# Patient Record
Sex: FEMALE | Race: WHITE
[De-identification: names, ages, dates, MRNs, and addresses within clinical notes are randomized per-mention and may not be internally consistent; named-entity substitution may affect disease eponyms.]

---

## 2015-02-24 RX ORDER — OLANZAPINE 15 MG/1
1 TABLET, FILM COATED ORAL
Qty: 0 | Refills: 0 | COMMUNITY
Start: 2015-02-24

## 2015-02-24 RX ORDER — ATORVASTATIN CALCIUM 80 MG/1
1 TABLET, FILM COATED ORAL
Qty: 0 | Refills: 0 | COMMUNITY
Start: 2015-02-24

## 2017-04-19 ENCOUNTER — APPOINTMENT (OUTPATIENT)
Dept: ORTHOPEDIC SURGERY | Facility: CLINIC | Age: 77
End: 2017-04-19

## 2017-04-19 DIAGNOSIS — Z96.659 PRESENCE OF UNSPECIFIED ARTIFICIAL KNEE JOINT: ICD-10-CM

## 2017-04-19 DIAGNOSIS — M54.5 LOW BACK PAIN: ICD-10-CM

## 2017-04-19 RX ORDER — ATORVASTATIN CALCIUM 80 MG/1
TABLET, FILM COATED ORAL
Refills: 0 | Status: ACTIVE | COMMUNITY

## 2017-04-19 RX ORDER — SERTRALINE 25 MG/1
TABLET, FILM COATED ORAL
Refills: 0 | Status: ACTIVE | COMMUNITY

## 2017-04-19 RX ORDER — DIVALPROEX SODIUM 500 MG/1
TABLET, DELAYED RELEASE ORAL
Refills: 0 | Status: ACTIVE | COMMUNITY

## 2017-04-19 RX ORDER — OLANZAPINE 20 MG/1
TABLET, FILM COATED ORAL
Refills: 0 | Status: ACTIVE | COMMUNITY

## 2017-04-21 ENCOUNTER — INPATIENT (INPATIENT)
Facility: HOSPITAL | Age: 77
LOS: 4 days | Discharge: HOME | DRG: 312 | End: 2017-04-26
Attending: HOSPITALIST | Admitting: HOSPITALIST
Payer: MEDICARE

## 2017-04-21 VITALS — SYSTOLIC BLOOD PRESSURE: 120 MMHG | DIASTOLIC BLOOD PRESSURE: 60 MMHG | HEART RATE: 70 BPM | RESPIRATION RATE: 16 BRPM

## 2017-04-21 DIAGNOSIS — R55 SYNCOPE AND COLLAPSE: ICD-10-CM

## 2017-04-21 DIAGNOSIS — F32.2 MAJOR DEPRESSIVE DISORDER, SINGLE EPISODE, SEVERE WITHOUT PSYCHOTIC FEATURES: ICD-10-CM

## 2017-04-21 DIAGNOSIS — Z98.89 OTHER SPECIFIED POSTPROCEDURAL STATES: Chronic | ICD-10-CM

## 2017-04-21 DIAGNOSIS — Z96.651 PRESENCE OF RIGHT ARTIFICIAL KNEE JOINT: Chronic | ICD-10-CM

## 2017-04-21 DIAGNOSIS — K46.9 UNSPECIFIED ABDOMINAL HERNIA WITHOUT OBSTRUCTION OR GANGRENE: Chronic | ICD-10-CM

## 2017-04-21 DIAGNOSIS — E78.5 HYPERLIPIDEMIA, UNSPECIFIED: ICD-10-CM

## 2017-04-21 LAB
ALBUMIN SERPL ELPH-MCNC: 4.2 G/DL — SIGNIFICANT CHANGE UP (ref 3.3–5)
ALP SERPL-CCNC: 60 U/L — SIGNIFICANT CHANGE UP (ref 40–120)
ALT FLD-CCNC: 12 U/L RC — SIGNIFICANT CHANGE UP (ref 10–45)
ANION GAP SERPL CALC-SCNC: 11 MMOL/L — SIGNIFICANT CHANGE UP (ref 5–17)
APAP SERPL-MCNC: <15 UG/ML — SIGNIFICANT CHANGE UP (ref 10–30)
APPEARANCE UR: CLEAR — SIGNIFICANT CHANGE UP
APTT BLD: 30 SEC — SIGNIFICANT CHANGE UP (ref 27.5–37.4)
AST SERPL-CCNC: 19 U/L — SIGNIFICANT CHANGE UP (ref 10–40)
BACTERIA # UR AUTO: ABNORMAL /HPF
BASOPHILS # BLD AUTO: 0.1 K/UL — SIGNIFICANT CHANGE UP (ref 0–0.2)
BASOPHILS NFR BLD AUTO: 1.1 % — SIGNIFICANT CHANGE UP (ref 0–2)
BILIRUB SERPL-MCNC: 0.2 MG/DL — SIGNIFICANT CHANGE UP (ref 0.2–1.2)
BILIRUB UR-MCNC: NEGATIVE — SIGNIFICANT CHANGE UP
BUN SERPL-MCNC: 15 MG/DL — SIGNIFICANT CHANGE UP (ref 7–23)
CALCIUM SERPL-MCNC: 10 MG/DL — SIGNIFICANT CHANGE UP (ref 8.4–10.5)
CHLORIDE SERPL-SCNC: 98 MMOL/L — SIGNIFICANT CHANGE UP (ref 96–108)
CK MB BLD-MCNC: 3.4 % — SIGNIFICANT CHANGE UP (ref 0–3.5)
CK MB CFR SERPL CALC: 3.4 NG/ML — SIGNIFICANT CHANGE UP (ref 0–3.8)
CK MB CFR SERPL CALC: 4 NG/ML — HIGH (ref 0–3.8)
CK SERPL-CCNC: 114 U/L — SIGNIFICANT CHANGE UP (ref 25–170)
CK SERPL-CCNC: 117 U/L — SIGNIFICANT CHANGE UP (ref 25–170)
CO2 SERPL-SCNC: 28 MMOL/L — SIGNIFICANT CHANGE UP (ref 22–31)
COLOR SPEC: SIGNIFICANT CHANGE UP
COMMENT - URINE: SIGNIFICANT CHANGE UP
CREAT SERPL-MCNC: 0.8 MG/DL — SIGNIFICANT CHANGE UP (ref 0.5–1.3)
DIFF PNL FLD: NEGATIVE — SIGNIFICANT CHANGE UP
EOSINOPHIL # BLD AUTO: 0.1 K/UL — SIGNIFICANT CHANGE UP (ref 0–0.5)
EOSINOPHIL NFR BLD AUTO: 1 % — SIGNIFICANT CHANGE UP (ref 0–6)
EPI CELLS # UR: SIGNIFICANT CHANGE UP /HPF
ETHANOL SERPL-MCNC: SIGNIFICANT CHANGE UP MG/DL (ref 0–10)
GAS PNL BLDV: SIGNIFICANT CHANGE UP
GLUCOSE SERPL-MCNC: 117 MG/DL — HIGH (ref 70–99)
GLUCOSE UR QL: NEGATIVE — SIGNIFICANT CHANGE UP
HCT VFR BLD CALC: 36.8 % — SIGNIFICANT CHANGE UP (ref 34.5–45)
HGB BLD-MCNC: 12.2 G/DL — SIGNIFICANT CHANGE UP (ref 11.5–15.5)
INR BLD: 1.05 RATIO — SIGNIFICANT CHANGE UP (ref 0.88–1.16)
KETONES UR-MCNC: NEGATIVE — SIGNIFICANT CHANGE UP
LEUKOCYTE ESTERASE UR-ACNC: NEGATIVE — SIGNIFICANT CHANGE UP
LYMPHOCYTES # BLD AUTO: 2.2 K/UL — SIGNIFICANT CHANGE UP (ref 1–3.3)
LYMPHOCYTES # BLD AUTO: 25.1 % — SIGNIFICANT CHANGE UP (ref 13–44)
MCHC RBC-ENTMCNC: 32.8 PG — SIGNIFICANT CHANGE UP (ref 27–34)
MCHC RBC-ENTMCNC: 33.2 GM/DL — SIGNIFICANT CHANGE UP (ref 32–36)
MCV RBC AUTO: 98.9 FL — SIGNIFICANT CHANGE UP (ref 80–100)
MONOCYTES # BLD AUTO: 0.9 K/UL — SIGNIFICANT CHANGE UP (ref 0–0.9)
MONOCYTES NFR BLD AUTO: 10.1 % — SIGNIFICANT CHANGE UP (ref 2–14)
NEUTROPHILS # BLD AUTO: 5.4 K/UL — SIGNIFICANT CHANGE UP (ref 1.8–7.4)
NEUTROPHILS NFR BLD AUTO: 62.7 % — SIGNIFICANT CHANGE UP (ref 43–77)
NITRITE UR-MCNC: NEGATIVE — SIGNIFICANT CHANGE UP
PCP SPEC-MCNC: SIGNIFICANT CHANGE UP
PH UR: 6.5 — SIGNIFICANT CHANGE UP (ref 5–8)
PLATELET # BLD AUTO: 217 K/UL — SIGNIFICANT CHANGE UP (ref 150–400)
POTASSIUM SERPL-MCNC: 4.6 MMOL/L — SIGNIFICANT CHANGE UP (ref 3.5–5.3)
POTASSIUM SERPL-SCNC: 4.6 MMOL/L — SIGNIFICANT CHANGE UP (ref 3.5–5.3)
PROT SERPL-MCNC: 8.1 G/DL — SIGNIFICANT CHANGE UP (ref 6–8.3)
PROT UR-MCNC: NEGATIVE — SIGNIFICANT CHANGE UP
PROTHROM AB SERPL-ACNC: 11.3 SEC — SIGNIFICANT CHANGE UP (ref 9.8–12.7)
RBC # BLD: 3.72 M/UL — LOW (ref 3.8–5.2)
RBC # FLD: 12.2 % — SIGNIFICANT CHANGE UP (ref 10.3–14.5)
SALICYLATES SERPL-MCNC: <2 MG/DL — LOW (ref 15–30)
SODIUM SERPL-SCNC: 137 MMOL/L — SIGNIFICANT CHANGE UP (ref 135–145)
SP GR SPEC: 1.01 — SIGNIFICANT CHANGE UP (ref 1.01–1.02)
TROPONIN T SERPL-MCNC: <0.01 NG/ML — SIGNIFICANT CHANGE UP (ref 0–0.06)
TROPONIN T SERPL-MCNC: <0.01 NG/ML — SIGNIFICANT CHANGE UP (ref 0–0.06)
UROBILINOGEN FLD QL: NEGATIVE — SIGNIFICANT CHANGE UP
VALPROATE SERPL-MCNC: 70 UG/ML — SIGNIFICANT CHANGE UP (ref 50–100)
WBC # BLD: 8.6 K/UL — SIGNIFICANT CHANGE UP (ref 3.8–10.5)
WBC # FLD AUTO: 8.6 K/UL — SIGNIFICANT CHANGE UP (ref 3.8–10.5)
WBC UR QL: SIGNIFICANT CHANGE UP /HPF (ref 0–5)

## 2017-04-21 PROCEDURE — 99223 1ST HOSP IP/OBS HIGH 75: CPT

## 2017-04-21 PROCEDURE — 93010 ELECTROCARDIOGRAM REPORT: CPT

## 2017-04-21 PROCEDURE — 99285 EMERGENCY DEPT VISIT HI MDM: CPT | Mod: 25

## 2017-04-21 PROCEDURE — 70450 CT HEAD/BRAIN W/O DYE: CPT | Mod: 26

## 2017-04-21 PROCEDURE — 71010: CPT | Mod: 26

## 2017-04-21 RX ORDER — OLANZAPINE 15 MG/1
10 TABLET, FILM COATED ORAL AT BEDTIME
Qty: 0 | Refills: 0 | Status: DISCONTINUED | OUTPATIENT
Start: 2017-04-21 | End: 2017-04-26

## 2017-04-21 RX ORDER — ATORVASTATIN CALCIUM 80 MG/1
40 TABLET, FILM COATED ORAL AT BEDTIME
Qty: 0 | Refills: 0 | Status: DISCONTINUED | OUTPATIENT
Start: 2017-04-21 | End: 2017-04-26

## 2017-04-21 RX ORDER — SERTRALINE 25 MG/1
1 TABLET, FILM COATED ORAL
Qty: 0 | Refills: 0 | COMMUNITY

## 2017-04-21 RX ORDER — ENOXAPARIN SODIUM 100 MG/ML
40 INJECTION SUBCUTANEOUS EVERY 24 HOURS
Qty: 0 | Refills: 0 | Status: DISCONTINUED | OUTPATIENT
Start: 2017-04-21 | End: 2017-04-26

## 2017-04-21 RX ORDER — DIVALPROEX SODIUM 500 MG/1
3 TABLET, DELAYED RELEASE ORAL
Qty: 0 | Refills: 0 | COMMUNITY

## 2017-04-21 RX ORDER — DIVALPROEX SODIUM 500 MG/1
750 TABLET, DELAYED RELEASE ORAL AT BEDTIME
Qty: 0 | Refills: 0 | Status: DISCONTINUED | OUTPATIENT
Start: 2017-04-21 | End: 2017-04-26

## 2017-04-21 RX ORDER — SERTRALINE 25 MG/1
100 TABLET, FILM COATED ORAL AT BEDTIME
Qty: 0 | Refills: 0 | Status: DISCONTINUED | OUTPATIENT
Start: 2017-04-21 | End: 2017-04-26

## 2017-04-21 RX ORDER — SODIUM CHLORIDE 9 MG/ML
3 INJECTION INTRAMUSCULAR; INTRAVENOUS; SUBCUTANEOUS ONCE
Qty: 0 | Refills: 0 | Status: COMPLETED | OUTPATIENT
Start: 2017-04-21 | End: 2017-04-21

## 2017-04-21 RX ADMIN — ENOXAPARIN SODIUM 40 MILLIGRAM(S): 100 INJECTION SUBCUTANEOUS at 22:51

## 2017-04-21 RX ADMIN — DIVALPROEX SODIUM 750 MILLIGRAM(S): 500 TABLET, DELAYED RELEASE ORAL at 22:52

## 2017-04-21 RX ADMIN — OLANZAPINE 10 MILLIGRAM(S): 15 TABLET, FILM COATED ORAL at 22:51

## 2017-04-21 RX ADMIN — SERTRALINE 100 MILLIGRAM(S): 25 TABLET, FILM COATED ORAL at 22:51

## 2017-04-21 RX ADMIN — ATORVASTATIN CALCIUM 40 MILLIGRAM(S): 80 TABLET, FILM COATED ORAL at 22:51

## 2017-04-21 RX ADMIN — SODIUM CHLORIDE 3 MILLILITER(S): 9 INJECTION INTRAMUSCULAR; INTRAVENOUS; SUBCUTANEOUS at 12:49

## 2017-04-21 NOTE — ED ADULT NURSE REASSESSMENT NOTE - NS ED NURSE REASSESS COMMENT FT1
Admit Papers given spouse at bedside alert and orientedx3 resp even and nonlab denies chest pain no catotonic state since in ED

## 2017-04-21 NOTE — ED PROVIDER NOTE - INTERPRETATION
EKG reviewed for rate, rhythm, axis, intervals and segments, including QRS morphology, P wave appearance T wave appearance, MI interval, and QT interval.  I find the EKG to be unremarkable in all of these regards except as follows: sinus bradycardia, RBBB

## 2017-04-21 NOTE — ED PROVIDER NOTE - ATTENDING CONTRIBUTION TO CARE
77 yo female with 3 minute staring episode this AM, now neurologically intact.  No prior episodes, no recent illness, afebrile.  On VPA for behavior control (multiple prior SI episodes).  Will CT head, check labs/ETOH/APAP/ASA levels, reassess.  If all labs and imaging are WNL and patient remains asymptomatic, may discharge with neurology outpatient follow-up.

## 2017-04-21 NOTE — H&P ADULT. - FAMILY HISTORY
Sibling  Still living? Unknown  Family history of acute myocardial infarction, Age at diagnosis: 51-60

## 2017-04-21 NOTE — H&P ADULT. - EKG AND INTERPRETATION
ECG reviewed and interpreted: sinus bradycardia at 56 bpm, RBBB (no significant changes vs prior 2014 ECG)

## 2017-04-21 NOTE — PATIENT PROFILE ADULT. - VISION (WITH CORRECTIVE LENSES IF THE PATIENT USUALLY WEARS THEM):
Partially impaired: cannot see medication labels or newsprint, but can see obstacles in path, and the surrounding layout; can count fingers at arm's length/Uses eye glasses

## 2017-04-21 NOTE — ED ADULT NURSE NOTE - PMH
Depression  hospitalized 2015 2/2 suicide attempt  HLD (hyperlipidemia)    Osteoarthritis of right knee, unspecified osteoarthritis type    Suicidal intent    Suicidal overdose

## 2017-04-21 NOTE — ED PROVIDER NOTE - OBJECTIVE STATEMENT
75 y/o F hx of depression , hld bibems s/p episode of  being unresponsive . As per  pt was eating breakfast and then dropped spoon and lost consciousness for 1- minutes and seemed to have a blank stare. No shaking activity, urinary incontinence or tongue biting. He states she came back to her usual self after 2 minutes . Pt doesn't recall event. Denies an prodromal sx before episode. She denies any  cp,sob, palpitations, dizziness, ha, visual changes , ab pain, n/v/d or dysuria.denies having any previous simialr sx. denies any recent illnes,s or recnt travel. rest of ros is otherwise neg. denies any current si/hi.

## 2017-04-21 NOTE — ED ADULT NURSE NOTE - OBJECTIVE STATEMENT
76 yr old female to ed via ems s/p per spouse pt became "catatonic" lasting few minutes. Pt awake alert and orientedx3 Resp even and nonlab Denies chest pain Denies sob Cap refill wnl Conjunctiva pink Denies dizziness or lightheadedness moving all four extremities No facial droop or slurred speech

## 2017-04-21 NOTE — H&P ADULT. - ASSESSMENT
75yo Female with PMH of HLD, OA, severe major depression with h/o multiple suicide attempts in the past (overdose on meds), presents with episode of lost consciousness.

## 2017-04-21 NOTE — H&P ADULT. - LAB RESULTS AND INTERPRETATION
Labs reviewed: no leukocytosis, not anemic, CE x 1 set negative, UA negative, potassium within normal range.

## 2017-04-21 NOTE — ED PROVIDER NOTE - MEDICAL DECISION MAKING DETAILS
75 y/o F bibems s/p syncope vs seizure. Pt now baseline AAOX4, on valproic acid /sertraline for depression. PE unremarkable. . Plan to obtain labs, ekg, ce, cth to r/o infectious/ structural cause and reassess

## 2017-04-21 NOTE — ED ADULT NURSE REASSESSMENT NOTE - NS ED NURSE REASSESS COMMENT FT1
Report received from Christa VU. Pt AAOx4, NAD, resp nonlabored, skin warm/dry, resting comfortably in bed. Pt denies any symptoms, pt denies headache, dizziness, chest pain, palpitations, SOB, abd pain, n/v/d, fevers, chills, weakness at this time. Pt awaiting bed. Safety maintained.   ED RN called report at 1926.  1931: Still on Hold, someone picked up and hung up.  1932: Called back, put on Hold again.  1935: Still on hold. Charge made aware.   1936: RN called [facility] back and was told 2[facility] RN has been paged twice for report, ED RN gave Red desk number to 2D rep for 2D RN to call report when she is ready. Charge MIRELLA Sellers made aware. Report received from Christa VU. Pt AAOx4, NAD, resp nonlabored, skin warm/dry, resting comfortably in bed. Pt denies any symptoms, pt denies headache, dizziness, chest pain, palpitations, SOB, abd pain, n/v/d, fevers, chills, weakness at this time. Pt awaiting bed. Safety maintained.   ED RN called report at 1926.  1931: Still on Hold, someone picked up and hung up.  1932: Called back, put on Hold again.  1935: Still on hold. Charge made aware.   1936: RN called [facility] back and was told [facility] RN has been paged twice for report, ED RN gave Red desk number to [facility] rep for [facility] RN to call report when she is ready. Charge MIRELLA Sellers made aware.  2025: Transport took pt to floor, no call received to Red desk for ED RN from [facility], Charge and Flow Karine aware no call ever received for report.

## 2017-04-21 NOTE — ED PROVIDER NOTE - CARE PLAN
Principal Discharge DX:	Syncope Principal Discharge DX:	Syncope  Secondary Diagnosis:	Seizure-like activity

## 2017-04-21 NOTE — H&P ADULT. - HISTORY OF PRESENT ILLNESS
77yo Female with PMH of HLD, OA, severe major depression with h/o multiple suicide attempts in the past (overdose on meds), presents with episode of lost consciousness. The  witnessed the episode - she sat down to eat breakfast, then dropped her spoon, and became unresponsive with eyes open for 2-5 minutes - no fall, no injury. She became spontaneously normal again and resumed eating as if nothing had occurred. The patient does not remember the incident. There was no seizure like activity, but the  did say the whole morning she had very slight bilateral hand tremors. She denies any symptoms before the LOC episode - no chest pain, no palpitations, no diaphoresis, no SOB. She also denies fevers, chills, sick contacts. Vitals on admission: T 98, HR 62, /68, RR 18, 96% RA. Admitted to medicine for further evaluation.     Of note, the patient's  personally dispenses her medications every day at bedtime. 77yo Female with PMH of HLD, OA, severe major depression with h/o multiple suicide attempts in the past (overdose on meds), presents with episode of lost consciousness. The  ([phone]) witnessed the episode - she sat down to eat breakfast, then dropped her spoon, and became unresponsive with eyes open for 2-5 minutes - no fall, no injury. She became spontaneously normal again and resumed eating as if nothing had occurred. The patient does not remember the incident. There was no seizure like activity, but the  did say the whole morning she had very slight bilateral hand tremors. She denies any symptoms before the LOC episode - no chest pain, no palpitations, no diaphoresis, no SOB. She also denies fevers, chills, sick contacts. Vitals on admission: T 98, HR 62, /68, RR 18, 96% RA. Admitted to medicine for further evaluation.     Of note, the patient's  personally dispenses her medications every day at bedtime.

## 2017-04-21 NOTE — H&P ADULT. - PROBLEM SELECTOR PLAN 2
c/w Olanzapine (may lower seizure threshold), divalproex (mood stabilizer), and zoloft per home regimen.

## 2017-04-21 NOTE — H&P ADULT. - PROBLEM SELECTOR PLAN 1
The LOC episode is of unclear etiology (eyes remained open during the 3-5 minutes per ) - and it resolved spontaneously in the same sitting position.  Differential diagnosis includes: seizure, arrhythmia, orthostatic hypotension with syncope, vasovagal episode.  Admit to telemetry - r/o arrhythmia. Patient also bradycardic to 56 on ECG - monitor for worsening bradycardia.  Check orthostatic vital signs.  Ambulate with assistance. Fall risk protocol.  Order spot EEG.  No evidence of CHF and no loud murmurs on cardiac exam - no need to obtain a TTE.  CT head negative for stroke, no weakness on exam.  Obtain 2 more sets of CE - r/o MI

## 2017-04-22 LAB
ANION GAP SERPL CALC-SCNC: 14 MMOL/L — SIGNIFICANT CHANGE UP (ref 5–17)
BASOPHILS # BLD AUTO: 0.05 K/UL — SIGNIFICANT CHANGE UP (ref 0–0.2)
BASOPHILS NFR BLD AUTO: 0.6 % — SIGNIFICANT CHANGE UP (ref 0–2)
BUN SERPL-MCNC: 18 MG/DL — SIGNIFICANT CHANGE UP (ref 7–23)
CALCIUM SERPL-MCNC: 9.4 MG/DL — SIGNIFICANT CHANGE UP (ref 8.4–10.5)
CHLORIDE SERPL-SCNC: 100 MMOL/L — SIGNIFICANT CHANGE UP (ref 96–108)
CK MB BLD-MCNC: 2.8 % — SIGNIFICANT CHANGE UP (ref 0–3.5)
CK MB CFR SERPL CALC: 3 NG/ML — SIGNIFICANT CHANGE UP (ref 0–3.8)
CK SERPL-CCNC: 106 U/L — SIGNIFICANT CHANGE UP (ref 25–170)
CO2 SERPL-SCNC: 22 MMOL/L — SIGNIFICANT CHANGE UP (ref 22–31)
CREAT SERPL-MCNC: 0.72 MG/DL — SIGNIFICANT CHANGE UP (ref 0.5–1.3)
EOSINOPHIL # BLD AUTO: 0.16 K/UL — SIGNIFICANT CHANGE UP (ref 0–0.5)
EOSINOPHIL NFR BLD AUTO: 1.8 % — SIGNIFICANT CHANGE UP (ref 0–6)
GLUCOSE SERPL-MCNC: 80 MG/DL — SIGNIFICANT CHANGE UP (ref 70–99)
HCT VFR BLD CALC: 32.6 % — LOW (ref 34.5–45)
HGB BLD-MCNC: 10.6 G/DL — LOW (ref 11.5–15.5)
IMM GRANULOCYTES NFR BLD AUTO: 0.2 % — SIGNIFICANT CHANGE UP (ref 0–1.5)
LYMPHOCYTES # BLD AUTO: 4.15 K/UL — HIGH (ref 1–3.3)
LYMPHOCYTES # BLD AUTO: 46.3 % — HIGH (ref 13–44)
MAGNESIUM SERPL-MCNC: 1.6 MG/DL — SIGNIFICANT CHANGE UP (ref 1.6–2.6)
MCHC RBC-ENTMCNC: 31.7 PG — SIGNIFICANT CHANGE UP (ref 27–34)
MCHC RBC-ENTMCNC: 32.5 GM/DL — SIGNIFICANT CHANGE UP (ref 32–36)
MCV RBC AUTO: 97.6 FL — SIGNIFICANT CHANGE UP (ref 80–100)
MONOCYTES # BLD AUTO: 0.67 K/UL — SIGNIFICANT CHANGE UP (ref 0–0.9)
MONOCYTES NFR BLD AUTO: 7.5 % — SIGNIFICANT CHANGE UP (ref 2–14)
NEUTROPHILS # BLD AUTO: 3.92 K/UL — SIGNIFICANT CHANGE UP (ref 1.8–7.4)
NEUTROPHILS NFR BLD AUTO: 43.6 % — SIGNIFICANT CHANGE UP (ref 43–77)
PHOSPHATE SERPL-MCNC: 3.8 MG/DL — SIGNIFICANT CHANGE UP (ref 2.5–4.5)
PLATELET # BLD AUTO: 242 K/UL — SIGNIFICANT CHANGE UP (ref 150–400)
POTASSIUM SERPL-MCNC: 4.3 MMOL/L — SIGNIFICANT CHANGE UP (ref 3.5–5.3)
POTASSIUM SERPL-SCNC: 4.3 MMOL/L — SIGNIFICANT CHANGE UP (ref 3.5–5.3)
RBC # BLD: 3.34 M/UL — LOW (ref 3.8–5.2)
RBC # FLD: 13.8 % — SIGNIFICANT CHANGE UP (ref 10.3–14.5)
SODIUM SERPL-SCNC: 136 MMOL/L — SIGNIFICANT CHANGE UP (ref 135–145)
TROPONIN T SERPL-MCNC: <0.01 NG/ML — SIGNIFICANT CHANGE UP (ref 0–0.06)
WBC # BLD: 8.97 K/UL — SIGNIFICANT CHANGE UP (ref 3.8–10.5)
WBC # FLD AUTO: 8.97 K/UL — SIGNIFICANT CHANGE UP (ref 3.8–10.5)

## 2017-04-22 PROCEDURE — 99233 SBSQ HOSP IP/OBS HIGH 50: CPT

## 2017-04-22 RX ADMIN — ATORVASTATIN CALCIUM 40 MILLIGRAM(S): 80 TABLET, FILM COATED ORAL at 21:27

## 2017-04-22 RX ADMIN — SERTRALINE 100 MILLIGRAM(S): 25 TABLET, FILM COATED ORAL at 21:27

## 2017-04-22 RX ADMIN — DIVALPROEX SODIUM 750 MILLIGRAM(S): 500 TABLET, DELAYED RELEASE ORAL at 21:27

## 2017-04-22 RX ADMIN — ENOXAPARIN SODIUM 40 MILLIGRAM(S): 100 INJECTION SUBCUTANEOUS at 21:28

## 2017-04-22 RX ADMIN — OLANZAPINE 10 MILLIGRAM(S): 15 TABLET, FILM COATED ORAL at 21:27

## 2017-04-23 LAB
HCT VFR BLD CALC: 33.5 % — LOW (ref 34.5–45)
HGB BLD-MCNC: 11 G/DL — LOW (ref 11.5–15.5)
MCHC RBC-ENTMCNC: 32.2 PG — SIGNIFICANT CHANGE UP (ref 27–34)
MCHC RBC-ENTMCNC: 32.8 GM/DL — SIGNIFICANT CHANGE UP (ref 32–36)
MCV RBC AUTO: 98 FL — SIGNIFICANT CHANGE UP (ref 80–100)
PLATELET # BLD AUTO: 235 K/UL — SIGNIFICANT CHANGE UP (ref 150–400)
RBC # BLD: 3.42 M/UL — LOW (ref 3.8–5.2)
RBC # FLD: 13.6 % — SIGNIFICANT CHANGE UP (ref 10.3–14.5)
WBC # BLD: 9.88 K/UL — SIGNIFICANT CHANGE UP (ref 3.8–10.5)
WBC # FLD AUTO: 9.88 K/UL — SIGNIFICANT CHANGE UP (ref 3.8–10.5)

## 2017-04-23 PROCEDURE — 99232 SBSQ HOSP IP/OBS MODERATE 35: CPT

## 2017-04-23 RX ADMIN — SERTRALINE 100 MILLIGRAM(S): 25 TABLET, FILM COATED ORAL at 22:06

## 2017-04-23 RX ADMIN — ENOXAPARIN SODIUM 40 MILLIGRAM(S): 100 INJECTION SUBCUTANEOUS at 22:05

## 2017-04-23 RX ADMIN — ATORVASTATIN CALCIUM 40 MILLIGRAM(S): 80 TABLET, FILM COATED ORAL at 22:06

## 2017-04-23 RX ADMIN — OLANZAPINE 10 MILLIGRAM(S): 15 TABLET, FILM COATED ORAL at 22:06

## 2017-04-23 RX ADMIN — DIVALPROEX SODIUM 750 MILLIGRAM(S): 500 TABLET, DELAYED RELEASE ORAL at 22:06

## 2017-04-24 LAB
ANION GAP SERPL CALC-SCNC: 11 MMOL/L — SIGNIFICANT CHANGE UP (ref 5–17)
ANION GAP SERPL CALC-SCNC: 16 MMOL/L — SIGNIFICANT CHANGE UP (ref 5–17)
BUN SERPL-MCNC: 14 MG/DL — SIGNIFICANT CHANGE UP (ref 7–23)
BUN SERPL-MCNC: 16 MG/DL — SIGNIFICANT CHANGE UP (ref 7–23)
CALCIUM SERPL-MCNC: 9.2 MG/DL — SIGNIFICANT CHANGE UP (ref 8.4–10.5)
CALCIUM SERPL-MCNC: 9.3 MG/DL — SIGNIFICANT CHANGE UP (ref 8.4–10.5)
CHLORIDE SERPL-SCNC: 89 MMOL/L — LOW (ref 96–108)
CHLORIDE SERPL-SCNC: 90 MMOL/L — LOW (ref 96–108)
CO2 SERPL-SCNC: 24 MMOL/L — SIGNIFICANT CHANGE UP (ref 22–31)
CO2 SERPL-SCNC: 25 MMOL/L — SIGNIFICANT CHANGE UP (ref 22–31)
CREAT SERPL-MCNC: 0.7 MG/DL — SIGNIFICANT CHANGE UP (ref 0.5–1.3)
CREAT SERPL-MCNC: 0.71 MG/DL — SIGNIFICANT CHANGE UP (ref 0.5–1.3)
GLUCOSE SERPL-MCNC: 108 MG/DL — HIGH (ref 70–99)
GLUCOSE SERPL-MCNC: 86 MG/DL — SIGNIFICANT CHANGE UP (ref 70–99)
HCT VFR BLD CALC: 30.4 % — LOW (ref 34.5–45)
HGB BLD-MCNC: 10.4 G/DL — LOW (ref 11.5–15.5)
MCHC RBC-ENTMCNC: 32.9 PG — SIGNIFICANT CHANGE UP (ref 27–34)
MCHC RBC-ENTMCNC: 34.2 GM/DL — SIGNIFICANT CHANGE UP (ref 32–36)
MCV RBC AUTO: 96.2 FL — SIGNIFICANT CHANGE UP (ref 80–100)
PLATELET # BLD AUTO: 211 K/UL — SIGNIFICANT CHANGE UP (ref 150–400)
POTASSIUM SERPL-MCNC: 4.3 MMOL/L — SIGNIFICANT CHANGE UP (ref 3.5–5.3)
POTASSIUM SERPL-MCNC: 4.4 MMOL/L — SIGNIFICANT CHANGE UP (ref 3.5–5.3)
POTASSIUM SERPL-SCNC: 4.3 MMOL/L — SIGNIFICANT CHANGE UP (ref 3.5–5.3)
POTASSIUM SERPL-SCNC: 4.4 MMOL/L — SIGNIFICANT CHANGE UP (ref 3.5–5.3)
RBC # BLD: 3.16 M/UL — LOW (ref 3.8–5.2)
RBC # FLD: 13.4 % — SIGNIFICANT CHANGE UP (ref 10.3–14.5)
SODIUM SERPL-SCNC: 126 MMOL/L — LOW (ref 135–145)
SODIUM SERPL-SCNC: 129 MMOL/L — LOW (ref 135–145)
WBC # BLD: 10.98 K/UL — HIGH (ref 3.8–10.5)
WBC # FLD AUTO: 10.98 K/UL — HIGH (ref 3.8–10.5)

## 2017-04-24 PROCEDURE — 95957 EEG DIGITAL ANALYSIS: CPT | Mod: 26

## 2017-04-24 PROCEDURE — 95819 EEG AWAKE AND ASLEEP: CPT | Mod: 26

## 2017-04-24 PROCEDURE — 99283 EMERGENCY DEPT VISIT LOW MDM: CPT

## 2017-04-24 PROCEDURE — 73560 X-RAY EXAM OF KNEE 1 OR 2: CPT | Mod: 26,LT

## 2017-04-24 PROCEDURE — 99233 SBSQ HOSP IP/OBS HIGH 50: CPT

## 2017-04-24 PROCEDURE — 93306 TTE W/DOPPLER COMPLETE: CPT | Mod: 26

## 2017-04-24 RX ORDER — SODIUM CHLORIDE 9 MG/ML
2 INJECTION INTRAMUSCULAR; INTRAVENOUS; SUBCUTANEOUS ONCE
Qty: 0 | Refills: 0 | Status: COMPLETED | OUTPATIENT
Start: 2017-04-24 | End: 2017-04-24

## 2017-04-24 RX ORDER — ACETAMINOPHEN 500 MG
650 TABLET ORAL EVERY 6 HOURS
Qty: 0 | Refills: 0 | Status: DISCONTINUED | OUTPATIENT
Start: 2017-04-24 | End: 2017-04-26

## 2017-04-24 RX ADMIN — SODIUM CHLORIDE 2 GRAM(S): 9 INJECTION INTRAMUSCULAR; INTRAVENOUS; SUBCUTANEOUS at 17:38

## 2017-04-24 RX ADMIN — OLANZAPINE 10 MILLIGRAM(S): 15 TABLET, FILM COATED ORAL at 21:20

## 2017-04-24 RX ADMIN — Medication 650 MILLIGRAM(S): at 13:41

## 2017-04-24 RX ADMIN — ENOXAPARIN SODIUM 40 MILLIGRAM(S): 100 INJECTION SUBCUTANEOUS at 22:04

## 2017-04-24 RX ADMIN — ATORVASTATIN CALCIUM 40 MILLIGRAM(S): 80 TABLET, FILM COATED ORAL at 21:20

## 2017-04-24 RX ADMIN — SERTRALINE 100 MILLIGRAM(S): 25 TABLET, FILM COATED ORAL at 21:21

## 2017-04-24 RX ADMIN — Medication 650 MILLIGRAM(S): at 12:21

## 2017-04-24 RX ADMIN — DIVALPROEX SODIUM 750 MILLIGRAM(S): 500 TABLET, DELAYED RELEASE ORAL at 21:20

## 2017-04-24 NOTE — PHYSICAL THERAPY INITIAL EVALUATION ADULT - ADDITIONAL COMMENTS
4/24 EEG done, results pending, CE negative x2, check orthostatics. CXR clear 4/24 EEG done, results pending, CE negative x2, check orthostatics. CXR clear  Pt states she was a community ambulator PTA, ambulating with a cane. Pt lives in a house with 4 steps to enter,. 13 to the bedroom with B railing.

## 2017-04-24 NOTE — PHYSICAL THERAPY INITIAL EVALUATION ADULT - GAIT DEVIATIONS NOTED, PT EVAL
decreased step length/decreased stride length/decreased weight-shifting ability/decreased jerod/antalgic gait, unsteady

## 2017-04-24 NOTE — EEG REPORT - NS EEG TEXT BOX
[facility]  Report of Routine EEG with Video  And Report of DigitalCompressed Spectral Array Analysis    [facility] [place] [facility]: [address], [address] [place] [address], Phone: [phone]  [facility]: [address], [place] [address], Phone: [phone]  Office: [address] [place] [address], [place] [address], [place] [address], Phone: [phone]    Patient Name: Xochilt Cabrera    Age: 76 y  : 1940  Patient ID: -, MRN #: MR# [id number], Location: [address] [place] [address]  Referring Physician: -    EEG #: [id number]  Study Date: 2017		    Technical Information:					  On Instrument: -  Placement and Labeling of Electrodes:  The EEG was performed utilizing 20 channels referential EEG connections (coronal over temporal over parasagittal montage) using all standard 10-20 electrode placements with EKG.  Recording was at a sampling rate of 256 samples per second per channel.  Time synchronized digital video recording was done simultaneously with EEG recording.  A low light infrared camera was used for low light recording.  Tito and seizure detection algorithms were utilized.  CSA Technical Component:  Quantitative EEG analysis using a separate Compressed Spectral Array (CSA) software package was conducted in real-time and run at bedside after set up by the technician, digitally displaying the power of electrographic frequencies included in the 1-30Hz band using a graded color map.  This data was reviewed and interpreted independently, and is reported in a separate section below.    History:  p/w  LOC    h/o HLD, OA, Depression, mediscation overdose    Medication	  <Medication>Depakote	    Study Interpretation:    FINDINGS:  The background was continuous, spontaneously variable and reactive.  During wakefulness, the posteriorly dominant rhythm consisted of symmetric, well modulated 9.5 Hz activity, with an amplitude to 20 uV, that attenuated to eye opening.  Low amplitude central beta was noted in wakefulness. There was intermittent irregular theta present over left temporal region.    Background Slowing:  Generalized slowing: none was present.  Focal slowing: none was present.    Sleep Background:  Drowsiness was characterized by fragmentation, attenuation, and slowing of the background activity.    Stage II sleep transients were not recorded.    Epileptiform Activity:   No epileptiform discharges were present.    Events:  No clinical events were recorded.  No seizures were recorded.    Activation Procedures:   -Hyperventilation was not performed.    -Photic stimulation was not performed.    Artifacts:  Intermittent myogenic and movement artifacts were noted.    ECG:  The heart rate on single channel ECG was predominantly between 60-70 BPM.    Compressed Spectral Array Digital Analysis    FINDINGS:  Compressed Spectral Array (CSA) data was reviewed separately and correlated with the electroencephalographic findings detailed above.  CSA showed a variable spectral pattern.  Areas of increased power in particular were reviewed in detail, and compared with the raw EEG data.  Areas of abrupt increases in spectral power were reviewed to exclude seizures, and were determined to be artifactual in nature.    The relative ratio of the power of delta range frequencies and faster frequencies remained stable over the course of the study.  There was no definitive increase in the relative power in the delta frequency spectrum apparent in the left hemisphere versus the right hemisphere.      Compressed Spectral Array (Digital Analysis) Summary/ Impression:  No persistent hemispheric asymmetry.  Intermittent areas of increased power reviewed, without definite epileptiform activity associated on CSA.      EEG Classification / Summary:  Normal awake and drowsy EEG study     Clinical Impression:  There were no epileptiform abnormalities recorded.        Fellow's prelim read  ________________________________________  JOSEFA Esqueda  Fellow in Neurophysiology/Epilepsy, [facility]  Report of Routine EEG with Video  And Report of DigitalCompressed Spectral Array Analysis    [facility]: [address] [facility] [address] [facility] [address], [address] [place] [address], Phone: [phone]  [facility]: [address], [place] [address], Phone: [phone]  Office: [address] [place] [address], [place] [address], [place] [address], Phone: [phone]    Patient Name: Xochilt Cabrera    Age: 76 y  : 1940  Patient ID: -, MRN #: MR# [id number], Location: [address] [place] [address]  Referring Physician: -    EEG #: [id number]  Study Date: 2017		    Technical Information:					  On Instrument: -  Placement and Labeling of Electrodes:  The EEG was performed utilizing 20 channels referential EEG connections (coronal over temporal over parasagittal montage) using all standard 10-20 electrode placements with EKG.  Recording was at a sampling rate of 256 samples per second per channel.  Time synchronized digital video recording was done simultaneously with EEG recording.  A low light infrared camera was used for low light recording.  Tito and seizure detection algorithms were utilized.  CSA Technical Component:  Quantitative EEG analysis using a separate Compressed Spectral Array (CSA) software package was conducted in real-time and run at bedside after set up by the technician, digitally displaying the power of electrographic frequencies included in the 1-30Hz band using a graded color map.  This data was reviewed and interpreted independently, and is reported in a separate section below.    History:  p/w  LOC    h/o HLD, OA, Depression, mediscation overdose    Medication	  <Medication>Depakote	    Study Interpretation:    FINDINGS:  The background was continuous, spontaneously variable and reactive.  During wakefulness, the posteriorly dominant rhythm consisted of symmetric, well modulated 9.5 Hz activity, with an amplitude to 20 uV, that attenuated to eye opening.  Low amplitude central beta was noted in wakefulness. There was intermittent irregular theta present over left temporal region.    Background Slowing:  Generalized slowing: none was present.  Focal slowing: none was present.    Sleep Background:  Drowsiness was characterized by fragmentation, attenuation, and slowing of the background activity.    Stage II sleep transients were not recorded.    Epileptiform Activity:   No epileptiform discharges were present.    Events:  No clinical events were recorded.  No seizures were recorded.    Activation Procedures:   -Hyperventilation was not performed.    -Photic stimulation was not performed.    Artifacts:  Intermittent myogenic and movement artifacts were noted.    ECG:  The heart rate on single channel ECG was predominantly between 60-70 BPM.    Compressed Spectral Array Digital Analysis    FINDINGS:  Compressed Spectral Array (CSA) data was reviewed separately and correlated with the electroencephalographic findings detailed above.  CSA showed a variable spectral pattern.  Areas of increased power in particular were reviewed in detail, and compared with the raw EEG data.  Areas of abrupt increases in spectral power were reviewed to exclude seizures, and were determined to be artifactual in nature.    The relative ratio of the power of delta range frequencies and faster frequencies remained stable over the course of the study.  There was no definitive increase in the relative power in the delta frequency spectrum apparent in the left hemisphere versus the right hemisphere.      Compressed Spectral Array (Digital Analysis) Summary/ Impression:  No persistent hemispheric asymmetry.  Intermittent areas of increased power reviewed, without definite epileptiform activity associated on CSA.      EEG Classification / Summary:  Normal awake and drowsy EEG study     Clinical Impression:  There were no epileptiform abnormalities recorded.        ________________________________________  JOSEFA Esqueda  Fellow in Neurophysiology/Epilepsy, [facility] [place]

## 2017-04-24 NOTE — PHYSICAL THERAPY INITIAL EVALUATION ADULT - PERTINENT HX OF CURRENT PROBLEM, REHAB EVAL
75yo Female with PMH of HLD, OA, severe major depression with h/o multiple suicide attempts in the past (overdose on meds), presents with episode of lost consciousness. The  ([phone]) witnessed the episode - she sat down to eat breakfast, then dropped her spoon, and became unresponsive with eyes open for 2-5 minutes - no fall, no injury. She became spontaneously normal again and resumed eating as if nothing had occurred. The patient does not remember the incident. CT head negative

## 2017-04-24 NOTE — PHYSICAL THERAPY INITIAL EVALUATION ADULT - DISCHARGE DISPOSITION, PT EVAL
Subacute Rehab, but if home, then home PT and assist with functional activities/rehabilitation facility

## 2017-04-24 NOTE — PHYSICAL THERAPY INITIAL EVALUATION ADULT - ACTIVE RANGE OF MOTION EXAMINATION, REHAB EVAL
davidson. upper extremity Active ROM was WNL (within normal limits)/RLE Active ROM was WNL (within normal limits)/decreased L hip flexion and knee ext 2/2 pain

## 2017-04-25 LAB
ANION GAP SERPL CALC-SCNC: 13 MMOL/L — SIGNIFICANT CHANGE UP (ref 5–17)
BUN SERPL-MCNC: 10 MG/DL — SIGNIFICANT CHANGE UP (ref 7–23)
CALCIUM SERPL-MCNC: 8.9 MG/DL — SIGNIFICANT CHANGE UP (ref 8.4–10.5)
CHLORIDE SERPL-SCNC: 94 MMOL/L — LOW (ref 96–108)
CO2 SERPL-SCNC: 25 MMOL/L — SIGNIFICANT CHANGE UP (ref 22–31)
CREAT SERPL-MCNC: 0.69 MG/DL — SIGNIFICANT CHANGE UP (ref 0.5–1.3)
GLUCOSE SERPL-MCNC: 94 MG/DL — SIGNIFICANT CHANGE UP (ref 70–99)
HCT VFR BLD CALC: 32.3 % — LOW (ref 34.5–45)
HGB BLD-MCNC: 10.9 G/DL — LOW (ref 11.5–15.5)
MCHC RBC-ENTMCNC: 32.4 PG — SIGNIFICANT CHANGE UP (ref 27–34)
MCHC RBC-ENTMCNC: 33.7 GM/DL — SIGNIFICANT CHANGE UP (ref 32–36)
MCV RBC AUTO: 96.1 FL — SIGNIFICANT CHANGE UP (ref 80–100)
OSMOLALITY SERPL: 267 MOS/KG — LOW (ref 275–300)
OSMOLALITY UR: 548 MOS/KG — SIGNIFICANT CHANGE UP (ref 300–900)
PLATELET # BLD AUTO: 226 K/UL — SIGNIFICANT CHANGE UP (ref 150–400)
POTASSIUM SERPL-MCNC: 4.4 MMOL/L — SIGNIFICANT CHANGE UP (ref 3.5–5.3)
POTASSIUM SERPL-SCNC: 4.4 MMOL/L — SIGNIFICANT CHANGE UP (ref 3.5–5.3)
RBC # BLD: 3.36 M/UL — LOW (ref 3.8–5.2)
RBC # FLD: 13.3 % — SIGNIFICANT CHANGE UP (ref 10.3–14.5)
SODIUM SERPL-SCNC: 132 MMOL/L — LOW (ref 135–145)
SODIUM UR-SCNC: 65 MMOL/L — SIGNIFICANT CHANGE UP
TSH SERPL-MCNC: 3.71 UIU/ML — SIGNIFICANT CHANGE UP (ref 0.27–4.2)
WBC # BLD: 10.85 K/UL — HIGH (ref 3.8–10.5)
WBC # FLD AUTO: 10.85 K/UL — HIGH (ref 3.8–10.5)

## 2017-04-25 PROCEDURE — 99283 EMERGENCY DEPT VISIT LOW MDM: CPT | Mod: 25

## 2017-04-25 PROCEDURE — 20610 DRAIN/INJ JOINT/BURSA W/O US: CPT | Mod: LT

## 2017-04-25 PROCEDURE — 99233 SBSQ HOSP IP/OBS HIGH 50: CPT

## 2017-04-25 RX ORDER — TRIAMCINOLONE 4 MG
40 TABLET ORAL ONCE
Qty: 0 | Refills: 0 | Status: DISCONTINUED | OUTPATIENT
Start: 2017-04-25 | End: 2017-04-26

## 2017-04-25 RX ORDER — SODIUM CHLORIDE 9 MG/ML
2 INJECTION INTRAMUSCULAR; INTRAVENOUS; SUBCUTANEOUS ONCE
Qty: 0 | Refills: 0 | Status: COMPLETED | OUTPATIENT
Start: 2017-04-25 | End: 2017-04-25

## 2017-04-25 RX ORDER — BUPIVACAINE HCL/PF 7.5 MG/ML
30 VIAL (ML) INJECTION ONCE
Qty: 0 | Refills: 0 | Status: DISCONTINUED | OUTPATIENT
Start: 2017-04-25 | End: 2017-04-26

## 2017-04-25 RX ADMIN — Medication 650 MILLIGRAM(S): at 12:58

## 2017-04-25 RX ADMIN — OLANZAPINE 10 MILLIGRAM(S): 15 TABLET, FILM COATED ORAL at 21:45

## 2017-04-25 RX ADMIN — SERTRALINE 100 MILLIGRAM(S): 25 TABLET, FILM COATED ORAL at 21:45

## 2017-04-25 RX ADMIN — SODIUM CHLORIDE 2 GRAM(S): 9 INJECTION INTRAMUSCULAR; INTRAVENOUS; SUBCUTANEOUS at 12:19

## 2017-04-25 RX ADMIN — DIVALPROEX SODIUM 750 MILLIGRAM(S): 500 TABLET, DELAYED RELEASE ORAL at 21:45

## 2017-04-25 RX ADMIN — ENOXAPARIN SODIUM 40 MILLIGRAM(S): 100 INJECTION SUBCUTANEOUS at 21:45

## 2017-04-25 RX ADMIN — ATORVASTATIN CALCIUM 40 MILLIGRAM(S): 80 TABLET, FILM COATED ORAL at 21:45

## 2017-04-25 RX ADMIN — Medication 650 MILLIGRAM(S): at 12:19

## 2017-04-26 ENCOUNTER — TRANSCRIPTION ENCOUNTER (OUTPATIENT)
Age: 77
End: 2017-04-26

## 2017-04-26 VITALS — OXYGEN SATURATION: 96 % | HEART RATE: 72 BPM | SYSTOLIC BLOOD PRESSURE: 118 MMHG | DIASTOLIC BLOOD PRESSURE: 70 MMHG

## 2017-04-26 LAB
ANION GAP SERPL CALC-SCNC: 12 MMOL/L — SIGNIFICANT CHANGE UP (ref 5–17)
BUN SERPL-MCNC: 9 MG/DL — SIGNIFICANT CHANGE UP (ref 7–23)
CALCIUM SERPL-MCNC: 9.1 MG/DL — SIGNIFICANT CHANGE UP (ref 8.4–10.5)
CHLORIDE SERPL-SCNC: 96 MMOL/L — SIGNIFICANT CHANGE UP (ref 96–108)
CO2 SERPL-SCNC: 25 MMOL/L — SIGNIFICANT CHANGE UP (ref 22–31)
CREAT SERPL-MCNC: 0.67 MG/DL — SIGNIFICANT CHANGE UP (ref 0.5–1.3)
GLUCOSE SERPL-MCNC: 81 MG/DL — SIGNIFICANT CHANGE UP (ref 70–99)
HCT VFR BLD CALC: 30.7 % — LOW (ref 34.5–45)
HGB BLD-MCNC: 10.8 G/DL — LOW (ref 11.5–15.5)
MCHC RBC-ENTMCNC: 33.8 PG — SIGNIFICANT CHANGE UP (ref 27–34)
MCHC RBC-ENTMCNC: 35.2 GM/DL — SIGNIFICANT CHANGE UP (ref 32–36)
MCV RBC AUTO: 95.9 FL — SIGNIFICANT CHANGE UP (ref 80–100)
PLATELET # BLD AUTO: 208 K/UL — SIGNIFICANT CHANGE UP (ref 150–400)
POTASSIUM SERPL-MCNC: 4.3 MMOL/L — SIGNIFICANT CHANGE UP (ref 3.5–5.3)
POTASSIUM SERPL-SCNC: 4.3 MMOL/L — SIGNIFICANT CHANGE UP (ref 3.5–5.3)
RBC # BLD: 3.2 M/UL — LOW (ref 3.8–5.2)
RBC # FLD: 13.2 % — SIGNIFICANT CHANGE UP (ref 10.3–14.5)
SODIUM SERPL-SCNC: 133 MMOL/L — LOW (ref 135–145)
WBC # BLD: 8.22 K/UL — SIGNIFICANT CHANGE UP (ref 3.8–10.5)
WBC # FLD AUTO: 8.22 K/UL — SIGNIFICANT CHANGE UP (ref 3.8–10.5)

## 2017-04-26 PROCEDURE — 99239 HOSP IP/OBS DSCHRG MGMT >30: CPT

## 2017-04-26 PROCEDURE — 82553 CREATINE MB FRACTION: CPT

## 2017-04-26 PROCEDURE — 73560 X-RAY EXAM OF KNEE 1 OR 2: CPT

## 2017-04-26 PROCEDURE — 85014 HEMATOCRIT: CPT

## 2017-04-26 PROCEDURE — 82435 ASSAY OF BLOOD CHLORIDE: CPT

## 2017-04-26 PROCEDURE — 80164 ASSAY DIPROPYLACETIC ACD TOT: CPT

## 2017-04-26 PROCEDURE — 93306 TTE W/DOPPLER COMPLETE: CPT

## 2017-04-26 PROCEDURE — 83735 ASSAY OF MAGNESIUM: CPT

## 2017-04-26 PROCEDURE — 99285 EMERGENCY DEPT VISIT HI MDM: CPT | Mod: 25

## 2017-04-26 PROCEDURE — 97116 GAIT TRAINING THERAPY: CPT

## 2017-04-26 PROCEDURE — 80307 DRUG TEST PRSMV CHEM ANLYZR: CPT

## 2017-04-26 PROCEDURE — 81001 URINALYSIS AUTO W/SCOPE: CPT

## 2017-04-26 PROCEDURE — 82947 ASSAY GLUCOSE BLOOD QUANT: CPT

## 2017-04-26 PROCEDURE — 97162 PT EVAL MOD COMPLEX 30 MIN: CPT

## 2017-04-26 PROCEDURE — 93005 ELECTROCARDIOGRAM TRACING: CPT

## 2017-04-26 PROCEDURE — 85027 COMPLETE CBC AUTOMATED: CPT

## 2017-04-26 PROCEDURE — 83605 ASSAY OF LACTIC ACID: CPT

## 2017-04-26 PROCEDURE — 82962 GLUCOSE BLOOD TEST: CPT

## 2017-04-26 PROCEDURE — 71045 X-RAY EXAM CHEST 1 VIEW: CPT

## 2017-04-26 PROCEDURE — 82803 BLOOD GASES ANY COMBINATION: CPT

## 2017-04-26 PROCEDURE — 82330 ASSAY OF CALCIUM: CPT

## 2017-04-26 PROCEDURE — 84295 ASSAY OF SERUM SODIUM: CPT

## 2017-04-26 PROCEDURE — 82550 ASSAY OF CK (CPK): CPT

## 2017-04-26 PROCEDURE — 80048 BASIC METABOLIC PNL TOTAL CA: CPT

## 2017-04-26 PROCEDURE — 84132 ASSAY OF SERUM POTASSIUM: CPT

## 2017-04-26 PROCEDURE — 84443 ASSAY THYROID STIM HORMONE: CPT

## 2017-04-26 PROCEDURE — 95819 EEG AWAKE AND ASLEEP: CPT

## 2017-04-26 PROCEDURE — 70450 CT HEAD/BRAIN W/O DYE: CPT

## 2017-04-26 PROCEDURE — 80053 COMPREHEN METABOLIC PANEL: CPT

## 2017-04-26 PROCEDURE — 85730 THROMBOPLASTIN TIME PARTIAL: CPT

## 2017-04-26 PROCEDURE — 84100 ASSAY OF PHOSPHORUS: CPT

## 2017-04-26 PROCEDURE — 84300 ASSAY OF URINE SODIUM: CPT

## 2017-04-26 PROCEDURE — G0378: CPT

## 2017-04-26 PROCEDURE — 85610 PROTHROMBIN TIME: CPT

## 2017-04-26 PROCEDURE — 83930 ASSAY OF BLOOD OSMOLALITY: CPT

## 2017-04-26 PROCEDURE — 84484 ASSAY OF TROPONIN QUANT: CPT

## 2017-04-26 PROCEDURE — 95957 EEG DIGITAL ANALYSIS: CPT

## 2017-04-26 PROCEDURE — 83935 ASSAY OF URINE OSMOLALITY: CPT

## 2017-04-26 RX ORDER — SODIUM CHLORIDE 9 MG/ML
2 INJECTION INTRAMUSCULAR; INTRAVENOUS; SUBCUTANEOUS
Qty: 2 | Refills: 0 | OUTPATIENT
Start: 2017-04-26 | End: 2017-04-27

## 2017-04-26 RX ORDER — SODIUM CHLORIDE 9 MG/ML
2 INJECTION INTRAMUSCULAR; INTRAVENOUS; SUBCUTANEOUS ONCE
Qty: 0 | Refills: 0 | Status: COMPLETED | OUTPATIENT
Start: 2017-04-26 | End: 2017-04-26

## 2017-04-26 RX ADMIN — SODIUM CHLORIDE 2 GRAM(S): 9 INJECTION INTRAMUSCULAR; INTRAVENOUS; SUBCUTANEOUS at 12:58

## 2017-04-26 NOTE — DISCHARGE NOTE ADULT - VISION (WITH CORRECTIVE LENSES IF THE PATIENT USUALLY WEARS THEM):
Uses eye glasses/Partially impaired: cannot see medication labels or newsprint, but can see obstacles in path, and the surrounding layout; can count fingers at arm's length

## 2017-04-26 NOTE — DISCHARGE NOTE ADULT - HOSPITAL COURSE
to be completed by physician 76 F p/w episode of unresponsiveness while sitting ia chair. Seen by cards and neuro and cleared for discharge. Hyponatremia likley sec to SIADH in setting of left knee pain. Hyponatremia improved on salt tabs and pt will f/u in 2 days and 5 days with PMD for sodium check. PMD office informed. L knee was aspirated and injected by her orthopedist with resolution of pain.

## 2017-04-26 NOTE — DISCHARGE NOTE ADULT - PLAN OF CARE
stable HOME CARE INSTRUCTIONS  Have someone stay with you until you feel stable.  Do not drive, operate machinery, or play sports until your caregiver says it is okay.  Keep all follow-up appointments as directed by your caregiver.   Lie down right away if you start feeling like you might faint. Breathe deeply and steadily. Wait until all the symptoms have passed.Drink enough fluids to keep your urine clear or pale yellow.  If you are taking blood pressure or heart medicine, get up slowly, taking several minutes to sit and then stand. This can reduce dizziness.  SEEK IMMEDIATE MEDICAL CARE IF:  You have a severe headache.  You have unusual pain in the chest, abdomen, or back.  You are bleeding from the mouth or rectum, or you have black or tarry stool.  You have an irregular or very fast heartbeat.  You have pain with breathing.  You have repeated fainting or seizure-like jerking during an episode.  You faint when sitting or lying down.  You have confusion.  You have difficulty walking.  You have severe weakness.  You have vision problems.  If you fainted, call your local emergency services (_____________________). Do not drive yourself to the hospital c/w current meds follow up with Dr. Jay within 1 week HOME CARE INSTRUCTIONS  Have someone stay with you until you feel stable.  Do not drive, operate machinery, or play sports until your caregiver says it is okay.  Keep all follow-up appointments as directed by your caregiver.   Lie down right away if you start feeling like you might faint. Breathe deeply and steadily. Wait until all the symptoms have passed.  SEEK IMMEDIATE MEDICAL CARE IF:  You have a severe headache.  You have unusual pain in the chest, abdomen, or back.  You are bleeding from the mouth or rectum, or you have black or tarry stool.  You have an irregular or very fast heartbeat.  You have pain with breathing.  You have repeated fainting or seizure-like jerking during an episode.  You faint when sitting or lying down.  You have confusion.  You have difficulty walking.  You have severe weakness.  You have vision problems.  If you fainted, call your local emergency services. Do not drive yourself to the hospital normal sodium level take one more salt tab tonight and then check your sodium level with Dr. Vann's office 4/28 and 5/1. Do not drink more than 1200 cc of water/day during this period

## 2017-04-26 NOTE — DISCHARGE NOTE ADULT - MEDICATION SUMMARY - MEDICATIONS TO TAKE
I will START or STAY ON the medications listed below when I get home from the hospital:    divalproex sodium 250 mg oral tablet, extended release  -- 3 tab(s) by mouth once a day (at bedtime)  -- Indication: For Seizure-like activity    Zoloft 100 mg oral tablet  -- 1 tab(s) by mouth once a day (at bedtime)  -- Indication: For depression     atorvastatin 40 mg oral tablet  -- 1 tab(s) by mouth once a day (at bedtime)  -- Indication: For HLD (hyperlipidemia)    OLANZapine 10 mg oral tablet  -- 1 tab(s) by mouth once a day (at bedtime)  -- Indication: For Severe depression I will START or STAY ON the medications listed below when I get home from the hospital:    divalproex sodium 250 mg oral tablet, extended release  -- 3 tab(s) by mouth once a day (at bedtime)  -- Indication: For Seizure-like activity    Zoloft 100 mg oral tablet  -- 1 tab(s) by mouth once a day (at bedtime)  -- Indication: For depression     atorvastatin 40 mg oral tablet  -- 1 tab(s) by mouth once a day (at bedtime)  -- Indication: For HLD (hyperlipidemia)    OLANZapine 10 mg oral tablet  -- 1 tab(s) by mouth once a day (at bedtime)  -- Indication: For Severe depression    sodium chloride 1 g oral tablet  -- 2 tab(s) by mouth once tonight  -- Indication: For low sodium

## 2017-04-26 NOTE — DISCHARGE NOTE ADULT - PATIENT PORTAL LINK FT
“You can access the Follow[employer] Patient Portal, offered by [facility], by registering with the following website: [email or web address]”

## 2017-04-26 NOTE — DISCHARGE NOTE ADULT - CARE PLAN
Principal Discharge DX:	Syncope  Goal:	stable  Instructions for follow-up, activity and diet:	HOME CARE INSTRUCTIONS  Have someone stay with you until you feel stable.  Do not drive, operate machinery, or play sports until your caregiver says it is okay.  Keep all follow-up appointments as directed by your caregiver.   Lie down right away if you start feeling like you might faint. Breathe deeply and steadily. Wait until all the symptoms have passed.Drink enough fluids to keep your urine clear or pale yellow.  If you are taking blood pressure or heart medicine, get up slowly, taking several minutes to sit and then stand. This can reduce dizziness.  SEEK IMMEDIATE MEDICAL CARE IF:  You have a severe headache.  You have unusual pain in the chest, abdomen, or back.  You are bleeding from the mouth or rectum, or you have black or tarry stool.  You have an irregular or very fast heartbeat.  You have pain with breathing.  You have repeated fainting or seizure-like jerking during an episode.  You faint when sitting or lying down.  You have confusion.  You have difficulty walking.  You have severe weakness.  You have vision problems.  If you fainted, call your local emergency services (_____________________). Do not drive yourself to the hospital  Secondary Diagnosis:	Severe depression  Goal:	stable  Instructions for follow-up, activity and diet:	c/w current meds  Secondary Diagnosis:	HLD (hyperlipidemia)  Goal:	stable  Instructions for follow-up, activity and diet:	c/w current meds Principal Discharge DX:	Syncope  Goal:	stable  Instructions for follow-up, activity and diet:	HOME CARE INSTRUCTIONS  Have someone stay with you until you feel stable.  Do not drive, operate machinery, or play sports until your caregiver says it is okay.  Keep all follow-up appointments as directed by your caregiver.   Lie down right away if you start feeling like you might faint. Breathe deeply and steadily. Wait until all the symptoms have passed.  SEEK IMMEDIATE MEDICAL CARE IF:  You have a severe headache.  You have unusual pain in the chest, abdomen, or back.  You are bleeding from the mouth or rectum, or you have black or tarry stool.  You have an irregular or very fast heartbeat.  You have pain with breathing.  You have repeated fainting or seizure-like jerking during an episode.  You faint when sitting or lying down.  You have confusion.  You have difficulty walking.  You have severe weakness.  You have vision problems.  If you fainted, call your local emergency services. Do not drive yourself to the hospital  Secondary Diagnosis:	Severe depression  Goal:	stable  Instructions for follow-up, activity and diet:	c/w current meds  Secondary Diagnosis:	Hyponatremia  Goal:	normal sodium level  Instructions for follow-up, activity and diet:	take one more salt tab tonight and then check your sodium level with Dr. Vann's office 4/28 and 5/1. Do not drink more than 1200 cc of water/day during this period  Secondary Diagnosis:	Knee pain  Instructions for follow-up, activity and diet:	follow up with Dr. Jay within 1 week

## 2017-04-26 NOTE — DISCHARGE NOTE ADULT - CARE PROVIDER_API CALL
Lewis Jay), Orthopaedic Surgery  [address] [place] [id number]  Phone: [phone]  Fax: [phone]

## 2017-04-26 NOTE — DISCHARGE NOTE ADULT - CARE PROVIDERS DIRECT ADDRESSES
[email or web address]

## 2017-05-17 ENCOUNTER — APPOINTMENT (OUTPATIENT)
Dept: NEUROSURGERY | Facility: CLINIC | Age: 77
End: 2017-05-17

## 2017-05-17 VITALS
HEART RATE: 93 BPM | DIASTOLIC BLOOD PRESSURE: 66 MMHG | HEIGHT: 68 IN | WEIGHT: 206 LBS | BODY MASS INDEX: 31.22 KG/M2 | SYSTOLIC BLOOD PRESSURE: 119 MMHG

## 2017-05-17 DIAGNOSIS — M51.37 OTHER INTERVERTEBRAL DISC DEGENERATION, LUMBOSACRAL REGION: ICD-10-CM

## 2017-05-17 DIAGNOSIS — M54.5 LOW BACK PAIN: ICD-10-CM

## 2017-05-26 ENCOUNTER — MESSAGE (OUTPATIENT)
Age: 77
End: 2017-05-26

## 2017-08-03 ENCOUNTER — MESSAGE (OUTPATIENT)
Age: 77
End: 2017-08-03

## 2017-10-31 ENCOUNTER — MESSAGE (OUTPATIENT)
Age: 77
End: 2017-10-31

## 2017-12-06 ENCOUNTER — MESSAGE (OUTPATIENT)
Age: 77
End: 2017-12-06

## 2018-01-04 ENCOUNTER — MESSAGE (OUTPATIENT)
Age: 78
End: 2018-01-04

## 2018-02-09 ENCOUNTER — MESSAGE (OUTPATIENT)
Age: 78
End: 2018-02-09

## 2018-04-16 ENCOUNTER — MESSAGE (OUTPATIENT)
Age: 78
End: 2018-04-16

## 2022-09-19 NOTE — H&P ADULT. - ALLERGIC/IMMUNOLOGIC
negative Tranexamic Acid Counseling:  Patient advised of the small risk of bleeding problems with tranexamic acid. They were also instructed to call if they developed any nausea, vomiting or diarrhea. All of the patient's questions and concerns were addressed.